# Patient Record
Sex: MALE | URBAN - METROPOLITAN AREA
[De-identification: names, ages, dates, MRNs, and addresses within clinical notes are randomized per-mention and may not be internally consistent; named-entity substitution may affect disease eponyms.]

---

## 2017-03-06 ENCOUNTER — OFFICE VISIT (OUTPATIENT)
Dept: FAMILY MEDICINE | Facility: CLINIC | Age: 47
End: 2017-03-06
Payer: COMMERCIAL

## 2017-03-06 VITALS
DIASTOLIC BLOOD PRESSURE: 72 MMHG | SYSTOLIC BLOOD PRESSURE: 127 MMHG | OXYGEN SATURATION: 100 % | TEMPERATURE: 98.7 F | BODY MASS INDEX: 27.48 KG/M2 | HEART RATE: 72 BPM | HEIGHT: 66 IN | WEIGHT: 171 LBS

## 2017-03-06 DIAGNOSIS — B36.0 PITYRIASIS VERSICOLOR: Primary | ICD-10-CM

## 2017-03-06 DIAGNOSIS — Z23 NEED FOR VACCINATION: ICD-10-CM

## 2017-03-06 PROCEDURE — 90715 TDAP VACCINE 7 YRS/> IM: CPT | Performed by: FAMILY MEDICINE

## 2017-03-06 PROCEDURE — 90471 IMMUNIZATION ADMIN: CPT | Performed by: FAMILY MEDICINE

## 2017-03-06 PROCEDURE — 99202 OFFICE O/P NEW SF 15 MIN: CPT | Mod: 25 | Performed by: FAMILY MEDICINE

## 2017-03-06 RX ORDER — KETOCONAZOLE 200 MG/1
TABLET ORAL
Qty: 2 TABLET | Refills: 0 | Status: SHIPPED | OUTPATIENT
Start: 2017-03-06

## 2017-03-06 NOTE — PROGRESS NOTES
"HPI:    I am seeing Christofer Ochoa for the 1st time at [facility] but he reminds me that I have seen him at my previous clinic. He is a 46 year old male here to discuss:    Tinea versicolor - color change on his skin around the neck, upper chest and back. No itch. No pain. This episode present since summer 2016.  Treatment:   Ketocanozole in the past has resolved the issue for many years.    ROS:    Const: No fevers, weight changes or night sweats recently.  ENT: No runny nose, sore throat or ear pain.  Resp: No cough or shortness of breath.  CV: No chest pain, dizziness or cardiac palpitations.  GI: No nausea, vomiting, diarrhea or constipation. Denies blood in stools or black stools.  : No dysuria, frequency or hematuria.    Exam:    /72 (Cuff Size: Adult Regular)  Pulse 72  Temp 98.7  F (37.1  C) (Oral)  Ht 5' 5.75\" (1.67 m)  Wt 171 lb (77.6 kg)  SpO2 100%  BMI 27.81 kg/m2    Gen: Healthy appearing male in no acute distress  Skin: slightly pigmented, scaly patches around the neck, upper chest and back.    Assessment and Plan - Decision Making    1. Pityriasis versicolor  See written instructions below.  - ketoconazole (NIZORAL) 200 MG tablet; Take one with orange juice, then work up a mild sweat in one hour by jogging. Repeat in one week.  Dispense: 2 tablet; Refill: 0    2. Need for vaccination    - 1st  Administration  [30773]  - TDAP (ADACEL AGES 11-64) [87469.002]      Written instructions given as follows:    Patient Instructions   1. Your skin is reacting to a common fungus found on many people.    2. It is not contagious.    3. It is difficult to get rid of this fungus.    4. But you can control it by taking medication off and on. The rash can go away for months, or even years.    5. Take Ketocanozole 200 mg with orange juice, then work up a mild sweat in one hour by jogging. Repeat in one week. This medication can affect your liver but should be safe short term.    6. Come back for a physical. Come " fasting so we can do some labs.                   Patient declined flu vaccine    Screening Questionnaire for Adult Immunization    Are you sick today?   No   Do you have allergies to medications, food, a vaccine component or latex?   No   Have you ever had a serious reaction after receiving a vaccination?   No   Do you have a long-term health problem with heart disease, lung disease, asthma, kidney disease, metabolic disease (e.g. diabetes), anemia, or other blood disorder?   No   Do you have cancer, leukemia, HIV/AIDS, or any other immune system problem?   No   In the past 3 months, have you taken medications that affect  your immune system, such as prednisone, other steroids, or anticancer drugs; drugs for the treatment of rheumatoid arthritis, Crohn s disease, or psoriasis; or have you had radiation treatments?   No   Have you had a seizure, or a brain or other nervous system problem?   No   During the past year, have you received a transfusion of blood or blood     products, or been given immune (gamma) globulin or antiviral drug?   No   For women: Are you pregnant or is there a chance you could become        pregnant during the next month?   No   Have you received any vaccinations in the past 4 weeks?   No     Immunization questionnaire answers were all negative.      MNVFC doesn't apply on this patient  Screening performed by Kathi Bragg on 3/6/2017 at 11:34 AM.

## 2017-03-06 NOTE — PATIENT INSTRUCTIONS
1. Your skin is reacting to a common fungus found on many people.    2. It is not contagious.    3. It is difficult to get rid of this fungus.    4. But you can control it by taking medication off and on. The rash can go away for months, or even years.    5. Take Ketocanozole 200 mg with orange juice, then work up a mild sweat in one hour by jogging. Repeat in one week. This medication can affect your liver but should be safe short term.    6. Come back for a physical. Come fasting so we can do some labs.

## 2017-03-06 NOTE — MR AVS SNAPSHOT
"              After Visit Summary   3/6/2017    Christofer Ochoa    MRN: [id number]           Patient Information     Date Of Birth          1970        Visit Information        Provider Department      3/6/2017 11:50 AM Robbin Heath MD [facility]        Today's Diagnoses     Pityriasis versicolor    -  1    Need for vaccination          Care Instructions    1. Your skin is reacting to a common fungus found on many people.    2. It is not contagious.    3. It is difficult to get rid of this fungus.    4. But you can control it by taking medication off and on. The rash can go away for months, or even years.    5. Take Ketocanozole 200 mg with orange juice, then work up a mild sweat in one hour by jogging. Repeat in one week. This medication can affect your liver but should be safe short term.    6. Come back for a physical. Come fasting so we can do some labs.           Follow-ups after your visit        Who to contact     If you have questions or need follow up information about today's clinic visit or your schedule please contact [facility] directly at [phone].  Normal or non-critical lab and imaging results will be communicated to you by CureDMhart, letter or phone within 4 business days after the clinic has received the results. If you do not hear from us within 7 days, please contact the clinic through Isentiot or phone. If you have a critical or abnormal lab result, we will notify you by phone as soon as possible.  Submit refill requests through Surgient or call your pharmacy and they will forward the refill request to us. Please allow 3 business days for your refill to be completed.          Additional Information About Your Visit        CureDMharUbimo Information     Surgient lets you send messages to your doctor, view your test results, renew your prescriptions, schedule appointments and more. To sign up, go to [email or web address] . Click on \"Log in\" on the left side of the " "screen, which will take you to the Welcome page. Then click on \"Sign up Now\" on the right side of the page.     You will be asked to enter the access code listed below, as well as some personal information. Please follow the directions to create your username and password.     Your access code is: [email or web address]  Expires: 2017 11:51 AM     Your access code will  in 90 days. If you need help or a new code, please call your [facility] or [phone].        Care EveryWhere ID     This is your Care EveryWhere ID. This could be used by other organizations to access your [facility] medical records  [id number]        Your Vitals Were     Pulse Temperature Height Pulse Oximetry BMI (Body Mass Index)       72 98.7  F (37.1  C) (Oral) 5' 5.75\" (1.67 m) 100% 27.81 kg/m2        Blood Pressure from Last 3 Encounters:   17 127/72    Weight from Last 3 Encounters:   17 171 lb (77.6 kg)              We Performed the Following     1st  Administration  [52808]     TDAP (ADACEL AGES 11-64) [63363.002]          Today's Medication Changes          These changes are accurate as of: 3/6/17 11:51 AM.  If you have any questions, ask your nurse or doctor.               Start taking these medicines.        Dose/Directions    ketoconazole 200 MG tablet   Commonly known as:  NIZORAL   Used for:  Pityriasis versicolor   Started by:  Robbin Heath MD        Take one with orange juice, then work up a mild sweat in one hour by jogging. Repeat in one week.   Quantity:  2 tablet   Refills:  0            Where to get your medicines      These medications were sent to [facility] [address] [facility], [address]  [address] [place] [address], [facility] [address]     Phone:  [phone]     ketoconazole 200 MG tablet                Primary Care Provider Office Phone # Fax #    [facility] [phone] [address] [facility]. [facility] [address]        Thank you!     Thank " you for choosing [facility]  [facility]. Our goal is always to provide you with excellent care. Hearing back from our patients is one way we can continue to improve our services. Please take a few minutes to complete the written survey that you may receive in the mail after your visit with us. Thank you!             Your Updated Medication List - Protect others around you: Learn how to safely use, store and throw away your medicines at www.disposemymeds.org.          This list is accurate as of: 3/6/17 11:51 AM.  Always use your most recent med list.                   Brand Name Dispense Instructions for use    ketoconazole 200 MG tablet    NIZORAL    2 tablet    Take one with orange juice, then work up a mild sweat in one hour by jogging. Repeat in one week.

## 2017-03-06 NOTE — NURSING NOTE
"Chief Complaint   Patient presents with     Derm Problem     -neck x 6-7 months       Initial /72 (Cuff Size: Adult Regular)  Pulse 72  Temp 98.7  F (37.1  C) (Oral)  Ht 5' 5.75\" (1.67 m)  Wt 171 lb (77.6 kg)  SpO2 100%  BMI 27.81 kg/m2 Estimated body mass index is 27.81 kg/(m^2) as calculated from the following:    Height as of this encounter: 5' 5.75\" (1.67 m).    Weight as of this encounter: 171 lb (77.6 kg).  Medication Reconciliation: complete    Kathi Bragg LPN    "

## 2021-07-16 ENCOUNTER — RECORDS - HEALTHEAST (OUTPATIENT)
Dept: ADMINISTRATIVE | Facility: CLINIC | Age: 51
End: 2021-07-16